# Patient Record
Sex: FEMALE | Race: WHITE | NOT HISPANIC OR LATINO | Employment: STUDENT | URBAN - METROPOLITAN AREA
[De-identification: names, ages, dates, MRNs, and addresses within clinical notes are randomized per-mention and may not be internally consistent; named-entity substitution may affect disease eponyms.]

---
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
## 2021-02-09 ENCOUNTER — TELEPHONE (OUTPATIENT)
Dept: FAMILY MEDICINE CLINIC | Facility: CLINIC | Age: 21
End: 2021-02-09

## 2021-02-09 NOTE — TELEPHONE ENCOUNTER
Received JEFFREY from [place] [facility] of [address]  Requesting medical records on pt  JEFFREY faxed to [phone] today

## 2021-03-30 DIAGNOSIS — Z23 ENCOUNTER FOR IMMUNIZATION: ICD-10-CM

## 2021-04-06 ENCOUNTER — IMMUNIZATIONS (OUTPATIENT)
Dept: FAMILY MEDICINE CLINIC | Facility: HOSPITAL | Age: 21
End: 2021-04-06

## 2021-04-06 DIAGNOSIS — Z23 ENCOUNTER FOR IMMUNIZATION: Primary | ICD-10-CM

## 2021-04-06 PROCEDURE — 0001A SARS-COV-2 / COVID-19 MRNA VACCINE (PFIZER-BIONTECH) 30 MCG: CPT

## 2021-04-06 PROCEDURE — 91300 SARS-COV-2 / COVID-19 MRNA VACCINE (PFIZER-BIONTECH) 30 MCG: CPT

## 2021-04-29 ENCOUNTER — IMMUNIZATIONS (OUTPATIENT)
Dept: FAMILY MEDICINE CLINIC | Facility: HOSPITAL | Age: 21
End: 2021-04-29

## 2021-04-29 DIAGNOSIS — Z23 ENCOUNTER FOR IMMUNIZATION: Primary | ICD-10-CM

## 2021-04-29 PROCEDURE — 0002A SARS-COV-2 / COVID-19 MRNA VACCINE (PFIZER-BIONTECH) 30 MCG: CPT

## 2021-04-29 PROCEDURE — 91300 SARS-COV-2 / COVID-19 MRNA VACCINE (PFIZER-BIONTECH) 30 MCG: CPT

## 2022-02-02 NOTE — PROGRESS NOTES
Pt asleep in bed  No visible signs of distress noted at this time  Bed low and locked; side rails up; call bell within reach  Will continue to monitor  Well Child Visit at 6 Months   AMBULATORY CARE:   A well child visit  is when your child sees a healthcare provider to prevent health problems  Well child visits are used to track your child's growth and development  It is also a time for you to ask questions and to get information on how to keep your child safe  Write down your questions so you remember to ask them  Your child should have regular well child visits from birth to 16 years  Development milestones your baby may reach at 6 months:  Each baby develops at his or her own pace  Your baby might have already reached the following milestones, or he or she may reach them later:  · Babble (make sounds like he or she is trying to say words)    · Reach for objects and grasp them, or use his or her fingers to rake an object and pick it up    · Understand that a dropped object did not disappear    · Pass objects from one hand to the other    · Roll from back to front and front to back    · Sit if he or she is supported or in a high chair    · Start getting teeth    · Sleep for 6 to 8 hours every night    · Crawl, or move around by lying on his or her stomach and pulling with his or her forearms    Keep your baby safe in the car:   · Always place your baby in a rear-facing car seat  Choose a seat that meets the Federal Motor Vehicle Safety Standard 213  Make sure the child safety seat has a harness and clip  Also make sure that the harness and clips fit snugly against your baby  There should be no more than a finger width of space between the strap and your baby's chest  Ask your healthcare provider for more information on car safety seats  · Always put your baby's car seat in the back seat  Never put your baby's car seat in the front  This will help prevent him or her from being injured in an accident  Keep your baby safe at home:   · Follow directions on the medicine label when you give your baby medicine    Ask your baby's healthcare provider for directions if you do not know how to give the medicine  If your baby misses a dose, do not double the next dose  Ask how to make up the missed dose  Do not give aspirin to children under 25years of age  Your child could develop Reye syndrome if he takes aspirin  Reye syndrome can cause life-threatening brain and liver damage  Check your child's medicine labels for aspirin, salicylates, or oil of wintergreen  · Do not leave your baby on a changing table, couch, bed, or infant seat alone  Your baby could roll or push himself or herself off  Keep one hand on your baby as you change his or her diaper or clothes  · Never leave your baby alone in the bathtub or sink  A baby can drown in less than 1 inch of water  · Always test the water temperature before you give your baby a bath  Test the water on your wrist before putting your baby in the bath to make sure it is not too hot  If you have a bath thermometer, the water temperature should be 90°F to 100°F (32 3°C to 37 8°C)  Keep your faucet water temperature lower than 120°F     · Never leave your baby in a playpen or crib with the drop-side down  Your baby could fall and be injured  Make sure that the drop-side is locked in place  · Place hernández at the top and bottom of stairs  Always make sure that the gate is closed and locked  [place] will help protect your baby from injury  · Do not let your baby use a walker  Walkers are not safe for your baby  Walkers do not help your baby learn to walk  Your baby can roll down the stairs  Walkers also allow your baby to reach higher  Your baby might reach for hot drinks, grab pot handles off the stove, or reach for medicines or other unsafe items  · Keep plastic bags, latex balloons, and small objects away from your baby  This includes marbles or small toys  These items can cause choking or suffocation  Regularly check the floor for these objects      · Keep all medicines, car supplies, lawn supplies, and cleaning supplies out of your baby's reach  Keep these items in a locked cabinet or closet  Call Poison Help (4-469.108.8972) if your baby eats anything that could be harmful  How to lay your baby down to sleep: It is very important to lay your baby down to sleep in safe surroundings  This can greatly reduce his or her risk for SIDS  Tell grandparents, babysitters, and anyone else who cares for your baby the following rules:  · Put your baby on his or her back to sleep  Do this every time he or she sleeps (naps and at night)  Do this even if your baby sleeps more soundly on his or her stomach or side  Your baby is less likely to choke on spit-up or vomit if he or she sleeps on his or her back  · Put your baby on a firm, flat surface to sleep  Your baby should sleep in a crib, bassinet, or cradle that meets the safety standards of the Consumer Product Safety Commission ([address])  Do not let him or her sleep on pillows, waterbeds, soft mattresses, quilts, beanbags, or other soft surfaces  Move your baby to his or her bed if he or she falls asleep in a car seat, stroller, or swing  He or she may change positions in a sitting device and not be able to breathe well  · Put your baby to sleep in a crib or bassinet that has firm sides  The rails around your baby's crib should not be more than 2? inches apart  A mesh crib should have small openings less than ¼ inch  · Put your baby in his or her own bed  A crib or bassinet in your room, near your bed, is the safest place for your baby to sleep  Never let him or her sleep in bed with you  Never let him or her sleep on a couch or recliner  · Do not leave soft objects or loose bedding in your baby's crib  His or her bed should contain only a mattress covered with a fitted bottom sheet  Use a sheet that is made for the mattress  Do not put pillows, bumpers, comforters, or stuffed animals in your baby's bed   Dress your baby in a sleep sack or other sleep clothing before you put him or her down to sleep  Avoid loose blankets  If you must use a blanket, tuck it around the mattress  · Do not let your baby get too hot  Keep the room at a temperature that is comfortable for an adult  Never dress him or her in more than 1 layer more than you would wear  Do not cover your baby's face or head while he or she sleeps  Your baby is too hot if he or she is sweating or his or her chest feels hot  · Do not raise the head of your baby's bed  Your baby could slide or roll into a position that makes it hard for him or her to breathe  What you need to know about nutrition for your baby:   · Continue to feed your baby breast milk or formula 4 to 5 times each day  As your baby starts to eat more solid foods, he or she may not want as much breast milk or formula as before  He or she may drink 24 to 32 ounces of breast milk or formula each day  · Do not use a microwave to heat your baby's bottle  The milk or formula will not heat evenly and will have spots that are very hot  Your baby's face or mouth could be burned  You can warm the milk or formula quickly by placing the bottle in a pot of warm water for a few minutes  · Do not prop a bottle in your baby's mouth  This may cause him or her to choke  Do not let him or her lie flat during a feeding  If your baby lies flat during a feeding, the milk may flow into his or her middle ear and cause an infection  · Offer iron-fortified infant cereal to your baby  Your baby's healthcare provider may suggest that you give your baby iron-fortified infant cereal with a spoon 2 or 3 times each day  Mix a single-grain cereal (such as rice cereal) with breast milk or formula  Offer him or her 1 to 3 teaspoons of infant cereal during each feeding  Sit your baby in a high chair to eat solid foods  Stop feeding your baby when he or she shows signs that he or she is full   These signs include leaning back or turning away     · Offer new foods to your baby after he or she is used to eating cereal   Offer foods such as strained fruits, cooked vegetables, and pureed meat  Give your baby only 1 new food every 2 to 7 days  Do not give your baby several new foods at the same time or foods with more than 1 ingredient  If your baby has a reaction to a new food, it will be hard to know which food caused the reaction  Reactions to look for include diarrhea, rash, or vomiting  · Do not overfeed your baby  Overfeeding means your baby gets too many calories during a feeding  This may cause him or her to gain weight too fast  Do not try to continue to feed your baby when he or she is no longer hungry  · Do not give your baby foods that can cause him or her to choke  These foods include hot dogs, grapes, raw fruits and vegetables, raisins, seeds, popcorn, and nuts  What you need to know about peanut allergies:   · Peanut allergies may be prevented by giving young babies peanut products  If your baby has severe eczema or an egg allergy, he or she is at risk for a peanut allergy  Your baby needs to be tested before he or she has a peanut product  Talk to your baby's healthcare provider  If your baby tests positive, the first peanut product must be given in the provider's office  The first taste may be when your baby is 3to 10months of age  · A peanut allergy test is not needed if your baby has mild to moderate eczema  Peanut products can be given around 10months of age  Talk to your baby's provider before you give the first taste  · If your baby does not have eczema, talk to his or her provider  He or she may say it is okay to give peanut products at 3to 10months of age  · Do not  give your baby chunky peanut butter or whole peanuts  He or she could choke  Give your baby smooth peanut butter or foods made with peanut butter  Keep your baby's teeth healthy:   · Clean your baby's teeth after breakfast and before bed    Use a soft toothbrush and a smear of toothpaste with fluoride  The smear should not be bigger than a grain of rice  Do not try to rinse your baby's mouth  The toothpaste will help prevent cavities  · Do not put juice or any other sweet liquid in your baby's bottle  Sweet liquids in a bottle may cause him or her to get cavities  Other ways to support your baby:   · Help your baby develop a healthy sleep-wake cycle  Your baby needs sleep to help him or her stay healthy and grow  Create a routine for bedtime  Bathe and feed your baby right before you put him or her to bed  This will help him or her relax and get to sleep easier  Put your baby in his or her crib when he or she is awake but sleepy  · Relieve your baby's teething discomfort with a cold teething ring  Ask your healthcare provider about other ways that you can relieve your baby's teething discomfort  Your baby's first tooth may appear between 3and 6months of age  Some symptoms of teething include drooling, irritability, fussiness, ear rubbing, and sore, tender gums  · Read to your baby  This will comfort your baby and help his or her brain develop  Point to pictures as you read  This will help your baby make connections between pictures and words  Have other family members or caregivers read to your baby  · Talk to your baby's healthcare provider about TV time  Experts usually recommend no TV for babies younger than 18 months  Your baby's brain will develop best through interaction with other people  This includes video chatting through a computer or phone with family or friends  Talk to your baby's healthcare provider if you want to let your baby watch TV  He or she can help you set healthy limits  Your provider may also be able to recommend appropriate programs for your baby  · Engage with your baby if he or she watches TV  Do not let your baby watch TV alone, if possible  You or another adult should watch with your baby   TV time should never replace active playtime  Turn the TV off when your baby plays  Do not let your baby watch TV during meals or within 1 hour of bedtime  · Do not smoke near your baby  Do not let anyone else smoke near your baby  Do not smoke in your home or vehicle  Smoke from cigarettes or cigars can cause asthma or breathing problems in your baby  · Take an infant CPR and first aid class  These classes will help teach you how to care for your baby in an emergency  Ask your baby's healthcare provider where you can take these classes  Care for yourself during this time:   · Go to all postpartum check-up visits  Your healthcare providers will check your health  Tell them if you have any questions or concerns about your health  They can also help you create or update meal plans  This can help you make sure you are getting enough calories and nutrients, especially if you are breastfeeding  Talk to your providers about an exercise plan  Exercise, such as walking, can help increase your energy levels, improve your mood, and manage your weight  Your providers will tell you how much activity to get each day, and which activities are best for you  · Find time for yourself  Ask a friend, family member, or your partner to watch the baby  Do activities that you enjoy and help you relax  Consider joining a support group with other women who recently had babies if you have not joined one already  It may be helpful to share information about caring for your babies  You can also talk about how you are feeling emotionally and physically  · Talk to your baby's pediatrician about postpartum depression  You may have had screening for postpartum depression during your baby's last well child visit  Screening may also be part of this visit  Screening means your baby's pediatrician will ask if you feel sad, depressed, or very tired  These feelings can be signs of postpartum depression   Tell him or her about any new or worsening problems you or your baby had since your last visit  Also describe anything that makes you feel worse or better  The pediatrician can help you get treatment, such as talk therapy, medicines, or both  What you need to know about your baby's next well child visit:  Your baby's healthcare provider will tell you when to bring your baby in again  The next well child visit is usually at 9 months  Contact your baby's healthcare provider if you have questions or concerns about his or her health or care before the next visit  Your baby may need vaccines at the next well child visit  Your provider will tell you which vaccines your baby needs and when your baby should get them  © Copyright [employer] 2021 Information is for End User's use only and may not be sold, redistributed or otherwise used for commercial purposes  All illustrations and images included in CareNotes® are the copyrighted property of A D A M , Inc  or [facility]   The above information is an  only  It is not intended as medical advice for individual conditions or treatments  Talk to your doctor, nurse or pharmacist before following any medical regimen to see if it is safe and effective for you  Dosing for Tylenol (acetaminophen): Use weight for dosing  Can give every 4 hours as needed  Dosing for ibuprofen:  Use dose by weight  Can give every 6-8 hours as needed

## 2025-06-10 ENCOUNTER — OFFICE VISIT (OUTPATIENT)
Dept: GASTROENTEROLOGY | Facility: CLINIC | Age: 25
End: 2025-06-10
Payer: COMMERCIAL

## 2025-06-10 VITALS
HEIGHT: 64 IN | DIASTOLIC BLOOD PRESSURE: 70 MMHG | BODY MASS INDEX: 31.41 KG/M2 | SYSTOLIC BLOOD PRESSURE: 110 MMHG | TEMPERATURE: 98.5 F | WEIGHT: 184 LBS

## 2025-06-10 DIAGNOSIS — K21.9 CHRONIC GERD: ICD-10-CM

## 2025-06-10 DIAGNOSIS — R07.9 CHEST PAIN, UNSPECIFIED TYPE: Primary | ICD-10-CM

## 2025-06-10 DIAGNOSIS — R13.10 DYSPHAGIA, UNSPECIFIED TYPE: ICD-10-CM

## 2025-06-10 PROCEDURE — 99204 OFFICE O/P NEW MOD 45 MIN: CPT | Performed by: INTERNAL MEDICINE

## 2025-06-10 RX ORDER — SODIUM CHLORIDE, SODIUM LACTATE, POTASSIUM CHLORIDE, CALCIUM CHLORIDE 600; 310; 30; 20 MG/100ML; MG/100ML; MG/100ML; MG/100ML
125 INJECTION, SOLUTION INTRAVENOUS CONTINUOUS
OUTPATIENT
Start: 2025-06-10

## 2025-06-10 RX ORDER — TIMOLOL MALEATE 5 MG/ML
1 SOLUTION/ DROPS OPHTHALMIC DAILY
COMMUNITY
Start: 2025-05-19

## 2025-06-10 RX ORDER — FLUTICASONE PROPIONATE 50 MCG
2 SPRAY, SUSPENSION (ML) NASAL DAILY
COMMUNITY
Start: 2025-04-10

## 2025-06-10 RX ORDER — POLYMYXIN B SULFATE AND TRIMETHOPRIM 1; 10000 MG/ML; [USP'U]/ML
SOLUTION OPHTHALMIC
COMMUNITY
Start: 2025-03-13

## 2025-06-10 NOTE — PROGRESS NOTES
Name: Shaila Goetz      : 2000      MRN: [id number]  Encounter Provider: Pola Aguayo MD  Encounter Date: 6/10/2025   Encounter department: [facility] [place]  :  Assessment & Plan  Chest pain, unspecified type  Patient having chronic symptoms of chest pain, heartburn and swallowing problems.  Discussed with patient that differentials include cardiac causes of chest pain given history of POTS, esophageal dysmotility, esophagitis, esophageal rings and strictures.  She sees cardiology regularly.  We will reach out to cardiology for cardiac clearance for EGD.  Will order EGD and plan to evaluate soon.  Continue Pepcid for now.  Orders:    EGD; Future    Chronic GERD  As above.  Orders:    EGD; Future    Dysphagia, unspecified type  As above.    Pola Aguayo MD  Gastroenterology  [facility]  Date: Karolyn 10, 2025    Orders:    EGD; Future        History of Present Illness   HPI  Shaila Goetz is a 24 y.o. female with anxiety, POTS, sees cardiology presents for evaluation.    Patient saw primary care provider on 4/10/2025 the note of which was reviewed by me today.  Dysphagia and heartburn were reported by the patient according to the note.  A trial of over-the-counter Pepcid was started and she was referred to GI for further evaluation.    Patient accompanied by mom to office today.  She reports that she has been having heartburn intermittently for years.  No nausea, vomiting.  She does report chest pain intermittently which has been going on for the last 1 year.  She reports swallowing problems mainly with solids.  No coughing or choking during episodes.  She tried famotidine which partially controlled her symptoms.  In the past she has tried omeprazole but she had allergic reaction to it so stopped the medication.  No abdominal pain, bloating or change in bowel habits.  No blood in stools.  She has had weight gain over the past 1 year.  She denies  "any family history of esophageal, stomach or colon cancer.    Patient had labs done in September 2024 which were reviewed by me today and showed normal blood counts, renal function, liver test and TSH levels.      Review of Systems   Constitutional:  Negative for chills and fever.   HENT:  Negative for ear pain and sore throat.    Eyes:  Negative for pain and visual disturbance.   Respiratory:  Negative for cough and shortness of breath.    Cardiovascular:  Negative for chest pain and palpitations.   Gastrointestinal:  Negative for abdominal pain and vomiting.   Genitourinary:  Negative for dysuria and hematuria.   Musculoskeletal:  Negative for arthralgias and back pain.   Skin:  Negative for color change and rash.   Neurological:  Negative for seizures and syncope.   All other systems reviewed and are negative.         Objective   /70   Temp 98.5 °F (36.9 °C) (Axillary)   Ht 5' 4\" (1.626 m)   Wt 83.5 kg (184 lb)   BMI 31.58 kg/m²      Physical Exam  Vitals and nursing note reviewed.   Constitutional:       General: She is not in acute distress.     Appearance: She is well-developed.   HENT:      Head: Normocephalic and atraumatic.     Eyes:      Conjunctiva/sclera: Conjunctivae normal.       Cardiovascular:      Rate and Rhythm: Normal rate and regular rhythm.      Heart sounds: No murmur heard.  Pulmonary:      Effort: Pulmonary effort is normal. No respiratory distress.      Breath sounds: Normal breath sounds.   Abdominal:      Palpations: Abdomen is soft.      Tenderness: There is no abdominal tenderness.     Musculoskeletal:         General: No swelling.      Cervical back: Neck supple.     Skin:     General: Skin is warm and dry.      Capillary Refill: Capillary refill takes less than 2 seconds.     Neurological:      Mental Status: She is alert.     Psychiatric:         Mood and Affect: Mood normal.           "

## 2025-06-10 NOTE — PATIENT INSTRUCTIONS
Scheduled date of EGD(as of today):07.10.25  Physician performing EGD:DR BETANCUR  Location of EGD:  Instructions reviewed with patient by:BEATRIS  Clearances: CARDIAC

## 2025-07-09 ENCOUNTER — TELEPHONE (OUTPATIENT)
Dept: OTHER | Facility: OTHER | Age: 25
End: 2025-07-09

## 2025-07-09 NOTE — TELEPHONE ENCOUNTER
Patient is calling to cancel procedure.    Date/Time:07/09/25 at 8:45 am    Procedure Type: GI EGD    Patient requesting call back to reschedule: YES [x] NO []    Patient is sick and would like to reschedule.

## 2025-07-10 NOTE — TELEPHONE ENCOUNTER
Scheduled date of EGD(as of today): 07/31/2025  Physician performing EGD: Dr. Aguayo   Location of EGD:    Instructions reviewed with patient by: Flores   Clearances: Cardiac (received)

## 2025-07-12 ENCOUNTER — HOSPITAL ENCOUNTER (EMERGENCY)
Facility: HOSPITAL | Age: 25
Discharge: HOME | End: 2025-07-12
Attending: EMERGENCY MEDICINE | Admitting: EMERGENCY MEDICINE
Payer: COMMERCIAL

## 2025-07-12 VITALS
HEART RATE: 94 BPM | DIASTOLIC BLOOD PRESSURE: 78 MMHG | OXYGEN SATURATION: 99 % | SYSTOLIC BLOOD PRESSURE: 138 MMHG | TEMPERATURE: 98 F | RESPIRATION RATE: 18 BRPM

## 2025-07-12 DIAGNOSIS — B08.4 HAND, FOOT AND MOUTH DISEASE: Primary | ICD-10-CM

## 2025-07-12 PROCEDURE — 99283 EMERGENCY DEPT VISIT LOW MDM: CPT | Performed by: EMERGENCY MEDICINE

## 2025-07-12 PROCEDURE — 99283 EMERGENCY DEPT VISIT LOW MDM: CPT

## 2025-07-12 NOTE — ED PROVIDER NOTES
"Time reflects when diagnosis was documented in both MDM as applicable and the Disposition within this note       Time User Action Codes Description Comment    7/12/2025  7:54 AM Jaki Zarate Add [B08.4] Hand, foot and mouth disease           ED Disposition       ED Disposition   Discharge    Condition   Stable    Date/Time   Sat Jul 12, 2025  7:53 AM    Comment   Shaila Goetz discharge to home/self care.                   Assessment & Plan       Medical Decision Making  Shaila is a 24-year-old female with a past medical history of POTS presenting to the emergency department due to painful/itching lesions on the hands and feet, as well as painful lesions in the nose.    DDx includes but is not limited to: Hand-foot-and-mouth, viral exanthem, warts, contact dermatitis , doubt chickenpox, poison ivy, or gonorrhea    Leading suspicion at this time is hand-foot-and-mouth, recommended the patient continue with Tylenol and Motrin.  Educated patient on general disease course, and advised on disease limiting procedures.    Results shared with patient. Return precautions given and patient expresses understanding and is comfortable with discharge.                Medications - No data to display    ED Risk Strat Scores                    No data recorded                            History of Present Illness       Chief Complaint   Patient presents with    Medical Problem     Pt reports not feeling well overall for about a week now, was seen at urgent care yesterday for pharyngitis. Pt reports bilateral foot pain that started after she shaved her caliculus down last week. Also concerned about being treated for warts on her fingers yesterday as well, but concerned for new \"things\" popping up on her hands. Denies fevers, chills, SOB/CP        Past Medical History[1]   Past Surgical History[2]   Family History[3]   Social History[4]   E-Cigarette/Vaping    E-Cigarette Use Never User       E-Cigarette/Vaping Substances    " Nicotine No     THC No     CBD No     Flavoring No     Other No     Unknown No       I have reviewed and agree with the history as documented.     Shaila is a 24-year-old female with a past medical history of POTS presenting to the emergency department due to painful/itching lesions on the hands and feet, as well as painful lesions in the nose.    Patient reports that beginning 4 days ago she began experiencing general viral symptoms, fevers, chills, malaise.  She reports that these were fairly well-controlled with Tylenol/Motrin usage.  She was seen yesterday at an urgent care for sore throat and diagnosed with viral pharyngitis.  However she reports beginning yesterday she noticed some small vesicles in her nose, hands, and feet.  And she reports that the sore throat persist.     Patient denies working with children, however she does report that her boyfriend's brothers were recently diagnosed with hand-foot-and-mouth disease and she is worried that she has the same.    Patient denies any nausea, vomiting, shortness of breath, or rash in any other locations.        Review of Systems   Constitutional:         As per HPI.   All other systems reviewed and are negative.          Objective       ED Triage Vitals [07/12/25 0737]   Temperature Pulse Blood Pressure Respirations SpO2 Patient Position - Orthostatic VS   98 °F (36.7 °C) 94 138/78 18 99 % --      Temp Source Heart Rate Source BP Location FiO2 (%) Pain Score    Oral Monitor -- -- --      Vitals      Date and Time Temp Pulse SpO2 Resp BP Pain Score FACES Pain Rating User   07/12/25 0737 98 °F (36.7 °C) 94 99 % 18 138/78 -- -- MM            Physical Exam  Vitals and nursing note reviewed.   Constitutional:       Appearance: Normal appearance.   HENT:      Head: Normocephalic and atraumatic.      Right Ear: External ear normal.      Left Ear: External ear normal.      Nose: Nose normal.      Mouth/Throat:      Mouth: Mucous membranes are moist.      Pharynx:  Oropharynx is clear.     Eyes:      Extraocular Movements: Extraocular movements intact.      Conjunctiva/sclera: Conjunctivae normal.       Cardiovascular:      Rate and Rhythm: Normal rate and regular rhythm.      Heart sounds: Normal heart sounds.   Pulmonary:      Effort: Pulmonary effort is normal.      Breath sounds: Normal breath sounds.   Abdominal:      Palpations: Abdomen is soft.     Musculoskeletal:         General: Normal range of motion.      Cervical back: Normal range of motion and neck supple.     Skin:     General: Skin is warm and dry.      Comments: Patient has a scattering of red lesions across the soles of her feet, palms of her hands, and the inner lining of her nose.  1 appears vesicular, the rest are red.  Patient reports they are itchy and painful.     Neurological:      General: No focal deficit present.      Mental Status: She is alert and oriented to person, place, and time.     Psychiatric:         Mood and Affect: Mood normal.         Behavior: Behavior normal.         Results Reviewed       None            No orders to display       Procedures    ED Medication and Procedure Management   Prior to Admission Medications   Prescriptions Last Dose Informant Patient Reported? Taking?   ALPRAZolam (XANAX) 0.5 mg tablet   Yes No   Sig: Take 0.5 mg by mouth as needed for anxiety   Ascorbic Acid, Vitamin C, (VITAMIN C) 100 MG tablet   Yes No   Sig: Take 100 mg by mouth daily   MAGNESIUM PO   Yes No   Sig: Take 250 mg by mouth   Vienva 0.1-20 MG-MCG per tablet   Yes No   Sig: Take 1 tablet by mouth in the morning   citalopram (CeleXA) 10 mg tablet   No No   Sig: Take 1.5 tablets (15 mg total) by mouth daily   desogestrel-ethinyl estradiol (APRI) 0.15-30 MG-MCG per tablet  Self Yes No   Sig: Take 1 tablet by mouth in the morning.   fluticasone (FLONASE) 50 mcg/act nasal spray   Yes No   Si sprays into each nostril daily   polymyxin b-trimethoprim (POLYTRIM) ophthalmic solution   Yes No   Sig:  INSTILL 1 DROP IN THE LEFT EYE EVERY 6 HOURS FOR 7 DAYS      Facility-Administered Medications: None     Patient's Medications   Discharge Prescriptions    No medications on file     No discharge procedures on file.  ED SEPSIS DOCUMENTATION   Time reflects when diagnosis was documented in both MDM as applicable and the Disposition within this note       Time User Action Codes Description Comment    7/12/2025  7:54 AM Jaki Zarate Add [B08.4] Hand, foot and mouth disease                    [1]   Past Medical History:  Diagnosis Date    Anxiety     Depression     POTS (postural orthostatic tachycardia syndrome)     Seasonal allergies    [2]   Past Surgical History:  Procedure Laterality Date    TEAR DUCT SURGERY      at age 2-3    UPPER GASTROINTESTINAL ENDOSCOPY      at age 11    WISDOM TOOTH EXTRACTION     [3] No family history on file.  [4]   Social History  Tobacco Use    Smoking status: Never    Smokeless tobacco: Never   Vaping Use    Vaping status: Never Used   Substance Use Topics    Alcohol use: Never    Drug use: Never        Jaki Zarate MD  07/12/25 0758

## 2025-07-28 ENCOUNTER — ANESTHESIA (OUTPATIENT)
Dept: ANESTHESIOLOGY | Facility: HOSPITAL | Age: 25
End: 2025-07-28

## 2025-07-28 ENCOUNTER — ANESTHESIA EVENT (OUTPATIENT)
Dept: ANESTHESIOLOGY | Facility: HOSPITAL | Age: 25
End: 2025-07-28

## 2025-07-31 ENCOUNTER — HOSPITAL ENCOUNTER (OUTPATIENT)
Dept: GASTROENTEROLOGY | Facility: HOSPITAL | Age: 25
Setting detail: OUTPATIENT SURGERY
Discharge: HOME | End: 2025-07-31
Attending: INTERNAL MEDICINE
Payer: COMMERCIAL

## 2025-07-31 ENCOUNTER — ANESTHESIA (OUTPATIENT)
Dept: GASTROENTEROLOGY | Facility: HOSPITAL | Age: 25
End: 2025-07-31
Payer: COMMERCIAL

## 2025-07-31 ENCOUNTER — ANESTHESIA EVENT (OUTPATIENT)
Dept: GASTROENTEROLOGY | Facility: HOSPITAL | Age: 25
End: 2025-07-31
Payer: COMMERCIAL

## 2025-07-31 VITALS
RESPIRATION RATE: 15 BRPM | OXYGEN SATURATION: 99 % | TEMPERATURE: 97.4 F | BODY MASS INDEX: 31.41 KG/M2 | HEIGHT: 64 IN | DIASTOLIC BLOOD PRESSURE: 76 MMHG | WEIGHT: 184 LBS | SYSTOLIC BLOOD PRESSURE: 118 MMHG | HEART RATE: 76 BPM

## 2025-07-31 DIAGNOSIS — K21.9 CHRONIC GERD: ICD-10-CM

## 2025-07-31 DIAGNOSIS — R13.10 DYSPHAGIA, UNSPECIFIED TYPE: ICD-10-CM

## 2025-07-31 DIAGNOSIS — R07.9 CHEST PAIN, UNSPECIFIED TYPE: ICD-10-CM

## 2025-07-31 DIAGNOSIS — K20.90 ESOPHAGITIS: Primary | ICD-10-CM

## 2025-07-31 LAB
EXT PREGNANCY TEST URINE: NEGATIVE
EXT. CONTROL: NORMAL

## 2025-07-31 PROCEDURE — 88305 TISSUE EXAM BY PATHOLOGIST: CPT | Performed by: PATHOLOGY

## 2025-07-31 PROCEDURE — 81025 URINE PREGNANCY TEST: CPT | Performed by: ANESTHESIOLOGY

## 2025-07-31 PROCEDURE — 43239 EGD BIOPSY SINGLE/MULTIPLE: CPT | Performed by: INTERNAL MEDICINE

## 2025-07-31 RX ORDER — LIDOCAINE HYDROCHLORIDE 20 MG/ML
INJECTION, SOLUTION EPIDURAL; INFILTRATION; INTRACAUDAL; PERINEURAL AS NEEDED
Status: DISCONTINUED | OUTPATIENT
Start: 2025-07-31 | End: 2025-07-31

## 2025-07-31 RX ORDER — ONDANSETRON 2 MG/ML
4 INJECTION INTRAMUSCULAR; INTRAVENOUS ONCE AS NEEDED
Status: DISCONTINUED | OUTPATIENT
Start: 2025-07-31 | End: 2025-08-04 | Stop reason: HOSPADM

## 2025-07-31 RX ORDER — ALBUTEROL SULFATE 0.83 MG/ML
2.5 SOLUTION RESPIRATORY (INHALATION) ONCE AS NEEDED
Status: DISCONTINUED | OUTPATIENT
Start: 2025-07-31 | End: 2025-08-04 | Stop reason: HOSPADM

## 2025-07-31 RX ORDER — PANTOPRAZOLE SODIUM 40 MG/1
40 TABLET, DELAYED RELEASE ORAL DAILY
Qty: 30 TABLET | Refills: 5 | Status: SHIPPED | OUTPATIENT
Start: 2025-07-31 | End: 2026-01-27

## 2025-07-31 RX ORDER — SODIUM CHLORIDE, SODIUM LACTATE, POTASSIUM CHLORIDE, CALCIUM CHLORIDE 600; 310; 30; 20 MG/100ML; MG/100ML; MG/100ML; MG/100ML
125 INJECTION, SOLUTION INTRAVENOUS CONTINUOUS
Status: DISCONTINUED | OUTPATIENT
Start: 2025-07-31 | End: 2025-08-04 | Stop reason: HOSPADM

## 2025-07-31 RX ORDER — PROPOFOL 10 MG/ML
INJECTION, EMULSION INTRAVENOUS AS NEEDED
Status: DISCONTINUED | OUTPATIENT
Start: 2025-07-31 | End: 2025-07-31

## 2025-07-31 RX ADMIN — LIDOCAINE HYDROCHLORIDE 100 MG: 20 INJECTION, SOLUTION EPIDURAL; INFILTRATION; INTRACAUDAL at 09:59

## 2025-07-31 RX ADMIN — SODIUM CHLORIDE, SODIUM LACTATE, POTASSIUM CHLORIDE, AND CALCIUM CHLORIDE 125 ML/HR: .6; .31; .03; .02 INJECTION, SOLUTION INTRAVENOUS at 08:52

## 2025-07-31 RX ADMIN — PROPOFOL 50 MG: 10 INJECTION, EMULSION INTRAVENOUS at 10:01

## 2025-07-31 RX ADMIN — PROPOFOL 50 MG: 10 INJECTION, EMULSION INTRAVENOUS at 10:00

## 2025-07-31 RX ADMIN — PROPOFOL 200 MG: 10 INJECTION, EMULSION INTRAVENOUS at 09:59

## 2025-08-07 ENCOUNTER — APPOINTMENT (EMERGENCY)
Dept: RADIOLOGY | Facility: HOSPITAL | Age: 25
End: 2025-08-07
Payer: COMMERCIAL

## 2025-08-07 ENCOUNTER — HOSPITAL ENCOUNTER (EMERGENCY)
Facility: HOSPITAL | Age: 25
Discharge: HOME | End: 2025-08-07
Attending: EMERGENCY MEDICINE
Payer: COMMERCIAL

## 2025-08-17 DIAGNOSIS — K20.90 ESOPHAGITIS: Primary | ICD-10-CM

## 2025-08-17 RX ORDER — FAMOTIDINE 20 MG/1
20 TABLET, FILM COATED ORAL 2 TIMES DAILY
Qty: 60 TABLET | Refills: 5 | Status: SHIPPED | OUTPATIENT
Start: 2025-08-17 | End: 2026-02-13

## 2025-08-17 RX ORDER — SUCRALFATE 1 G/1
1 TABLET ORAL 4 TIMES DAILY
Qty: 120 TABLET | Refills: 0 | Status: SHIPPED | OUTPATIENT
Start: 2025-08-17 | End: 2025-09-16